# Patient Record
Sex: MALE | Race: BLACK OR AFRICAN AMERICAN | Employment: UNEMPLOYED | URBAN - METROPOLITAN AREA
[De-identification: names, ages, dates, MRNs, and addresses within clinical notes are randomized per-mention and may not be internally consistent; named-entity substitution may affect disease eponyms.]

---

## 2022-02-04 ENCOUNTER — APPOINTMENT (OUTPATIENT)
Dept: GENERAL RADIOLOGY | Age: 42
End: 2022-02-04
Payer: COMMERCIAL

## 2022-02-04 ENCOUNTER — HOSPITAL ENCOUNTER (EMERGENCY)
Age: 42
Discharge: HOME | End: 2022-02-04
Attending: EMERGENCY MEDICINE
Payer: COMMERCIAL

## 2022-02-04 VITALS
SYSTOLIC BLOOD PRESSURE: 129 MMHG | WEIGHT: 180 LBS | HEART RATE: 94 BPM | TEMPERATURE: 98.7 F | OXYGEN SATURATION: 100 % | DIASTOLIC BLOOD PRESSURE: 90 MMHG | HEIGHT: 66 IN | RESPIRATION RATE: 20 BRPM | BODY MASS INDEX: 28.93 KG/M2

## 2022-02-04 DIAGNOSIS — S62.333A CLOSED DISPLACED FRACTURE OF NECK OF THIRD METACARPAL BONE OF LEFT HAND, INITIAL ENCOUNTER: Primary | ICD-10-CM

## 2022-02-04 PROCEDURE — 99283 EMERGENCY DEPT VISIT LOW MDM: CPT

## 2022-02-04 PROCEDURE — 73130 X-RAY EXAM OF HAND: CPT

## 2022-02-04 PROCEDURE — 29125 APPL SHORT ARM SPLINT STATIC: CPT

## 2022-02-04 RX ORDER — HYDROCODONE BITARTRATE AND ACETAMINOPHEN 5; 325 MG/1; MG/1
1 TABLET ORAL EVERY 4 HOURS PRN
Qty: 20 TABLET | Refills: 0 | Status: SHIPPED | OUTPATIENT
Start: 2022-02-04 | End: 2022-02-07

## 2022-02-04 RX ORDER — MELOXICAM 7.5 MG/1
7.5 TABLET ORAL DAILY
Qty: 90 TABLET | Refills: 1 | Status: SHIPPED | OUTPATIENT
Start: 2022-02-04

## 2022-02-04 ASSESSMENT — PAIN SCALES - GENERAL
PAINLEVEL_OUTOF10: 1
PAINLEVEL_OUTOF10: 0

## 2022-02-04 NOTE — Clinical Note
John Ventura was seen and treated in our emergency department on 2/4/2022. He may return to work on 02/09/2022. Please call Dr. Carmenza Kenney office, be seen on Tuesday in clinic for hand fracture     If you have any questions or concerns, please don't hesitate to call.       Krystal Nielsen MD

## 2022-02-04 NOTE — ED PROVIDER NOTES
eMERGENCY dEPARTMENT eNCOUnter        [facility]    Chief Complaint   Patient presents with    Hand Pain     LEFT hand, pt fell yesterday and broke the fall with his hand        HPI    Alen Pedroza is a 36 y.o. male who presents to the ER for evaluation of blunt hand trauma left, positive edema ecchymosis pain with range of motion, right-hand-dominant. On occupational injury. Aching crampy pain with range of motion. REVIEW OF SYSTEMS    See HPI for further details. Review of systems otherwise negative. PAST MEDICAL HISTORY    History reviewed. No pertinent past medical history. SURGICAL HISTORY    History reviewed. No pertinent surgical history. CURRENT MEDICATIONS        ALLERGIES    Allergies   Allergen Reactions    Food Other (See Comments)     Sore throat with bananas       FAMILY HISTORY    Family History   Problem Relation Age of Onset    Diabetes Father     Diabetes Maternal Grandmother     Heart Disease Maternal Grandmother     High Blood Pressure Maternal Grandmother        SOCIAL HISTORY    Social History     Socioeconomic History    Marital status: Single     Spouse name: None    Number of children: None    Years of education: None    Highest education level: None   Occupational History    None   Tobacco Use    Smoking status: Current Every Day Smoker     Packs/day: 1.00     Years: 9.00     Pack years: 9.00    Smokeless tobacco: Never Used   Substance and Sexual Activity    Alcohol use:  Yes     Alcohol/week: 0.8 standard drinks     Types: 1 Standard drinks or equivalent per week    Drug use: Yes     Types: Marijuana Isi Adam)    Sexual activity: None   Other Topics Concern    None   Social History Narrative    None     Social Determinants of Health     Financial Resource Strain:     Difficulty of Paying Living Expenses: Not on file   Food Insecurity:     Worried About Running Out of Food in the Last Year: Not on file    Nick of Food in the Last Year: Not on file Transportation Needs:     Lack of Transportation (Medical): Not on file    Lack of Transportation (Non-Medical): Not on file   Physical Activity:     Days of Exercise per Week: Not on file    Minutes of Exercise per Session: Not on file   Stress:     Feeling of Stress : Not on file   Social Connections:     Frequency of Communication with Friends and Family: Not on file    Frequency of Social Gatherings with Friends and Family: Not on file    Attends Yazidi Services: Not on file    Active Member of [address] [place] [address] or Organizations: Not on file    Attends Club or Organization Meetings: Not on file    Marital Status: Not on file   Intimate Partner Violence:     Fear of Current or Ex-Partner: Not on file    Emotionally Abused: Not on file    Physically Abused: Not on file    Sexually Abused: Not on file   Housing Stability:     Unable to Pay for Housing in the Last Year: Not on file    Number of [place] in the Last Year: Not on file    Unstable Housing in the Last Year: Not on file       PHYSICAL EXAM    VITAL SIGNS: BP (!) 129/90   Pulse 94   Temp 98.7 °F (37.1 °C) (Oral)   Resp 20   Ht 5' 6\" (1.676 m)   Wt 180 lb (81.6 kg)   SpO2 100%   BMI 29.05 kg/m²   Constitutional:  Well developed, well nourished, no acute distress, non-toxic appearance   HENT:  Atraumatic, external ears normal, nose normal, oropharynx moist.  Neck- normal range of motion, no tenderness, supple   Respiratory:  No respiratory distress, normal breath sounds.    Cardiovascular:  Normal rate, normal rhythm, no murmurs, no gallops, no rubs   GI:  Soft, nondistended, normal bowel sounds, nontender   Musculoskeletal: Edema and tenderness of the left mid dorsal hand, pain with flexion extension, no pulse deficit  Integument:  Well hydrated, no rash   Neurologic: Sensory or motor deficit none    RADIOLOGY/PROCEDURES    mild displaced spiral fracture of the third metacarpal    ED COURSE & MEDICAL DECISION MAKING    Pertinent Labs & Imaging studies reviewed. (See chart for details)  Surgical follow-up with  Repeat volar splint, analgesia work release until seen by Ortho possible need for surgical intervention, pin. FINAL IMPRESSION    1. Closed  displaced third metacarpal fracture left  2.          Aly Abel MD  06/61/97 Skye Berry MD  17/40/39 [id number]

## 2022-02-04 NOTE — Clinical Note
Susi Forde was seen and treated in our emergency department on 2/4/2022. He may return to work on 02/09/2022. Please call Dr. Dionte Roman office, be seen on Tuesday in clinic for hand fracture     If you have any questions or concerns, please don't hesitate to call.       Lonnie Martin MD

## 2022-02-09 ENCOUNTER — OFFICE VISIT (OUTPATIENT)
Dept: ORTHOPEDIC SURGERY | Age: 42
End: 2022-02-09
Payer: COMMERCIAL

## 2022-02-09 VITALS — WEIGHT: 175 LBS | BODY MASS INDEX: 28.12 KG/M2 | HEIGHT: 66 IN

## 2022-02-09 DIAGNOSIS — F17.200 CURRENT SMOKER: ICD-10-CM

## 2022-02-09 DIAGNOSIS — S62.323A CLOSED DISPLACED FRACTURE OF SHAFT OF THIRD METACARPAL BONE OF LEFT HAND, INITIAL ENCOUNTER: Primary | ICD-10-CM

## 2022-02-09 PROCEDURE — G8419 CALC BMI OUT NRM PARAM NOF/U: HCPCS | Performed by: ORTHOPAEDIC SURGERY

## 2022-02-09 PROCEDURE — 99203 OFFICE O/P NEW LOW 30 MIN: CPT | Performed by: ORTHOPAEDIC SURGERY

## 2022-02-09 PROCEDURE — G8427 DOCREV CUR MEDS BY ELIG CLIN: HCPCS | Performed by: ORTHOPAEDIC SURGERY

## 2022-02-09 PROCEDURE — 4004F PT TOBACCO SCREEN RCVD TLK: CPT | Performed by: ORTHOPAEDIC SURGERY

## 2022-02-09 PROCEDURE — 99406 BEHAV CHNG SMOKING 3-10 MIN: CPT | Performed by: ORTHOPAEDIC SURGERY

## 2022-02-09 PROCEDURE — 26600 TREAT METACARPAL FRACTURE: CPT | Performed by: ORTHOPAEDIC SURGERY

## 2022-02-09 PROCEDURE — L3807 WHFO W/O JOINTS PRE CST: HCPCS | Performed by: ORTHOPAEDIC SURGERY

## 2022-02-09 PROCEDURE — G8484 FLU IMMUNIZE NO ADMIN: HCPCS | Performed by: ORTHOPAEDIC SURGERY

## 2022-02-13 PROBLEM — S62.323A CLOSED DISPLACED FRACTURE OF SHAFT OF THIRD METACARPAL BONE OF LEFT HAND: Status: ACTIVE | Noted: 2022-02-13

## 2022-02-13 PROBLEM — F17.200 CURRENT SMOKER: Status: ACTIVE | Noted: 2022-02-13

## 2022-02-13 NOTE — PROGRESS NOTES
CHIEF COMPLAINT: Left hand pain/ 3rd MC shaft minimally displaced fracture. DATE OF INJURY: 2/3/2022, DOT 2/9/2022    HISTORY:  Mr. Triny Torres 36 y.o.  male right handed presents today for the first visit for evaluation of a left hand injury which occurred when he fell. He is complaining of ulnar hand pain and swelling. This is better with elevation and worse with ROM. The pain is sharp and not radiating. No other complaint. He was seen at [facility], where he was evaluated and splinted and asked to see orthopedics. He smokes 1PPD. History reviewed. No pertinent past medical history. History reviewed. No pertinent surgical history. Social History     Socioeconomic History    Marital status: Single     Spouse name: Not on file    Number of children: Not on file    Years of education: Not on file    Highest education level: Not on file   Occupational History    Not on file   Tobacco Use    Smoking status: Current Every Day Smoker     Packs/day: 1.00     Years: 9.00     Pack years: 9.00    Smokeless tobacco: Never Used   Substance and Sexual Activity    Alcohol use: Yes     Alcohol/week: 0.8 standard drinks     Types: 1 Standard drinks or equivalent per week    Drug use: Yes     Types: Marijuana Rondi Baba)    Sexual activity: Not on file   Other Topics Concern    Not on file   Social History Narrative    Not on file     Social Determinants of Health     Financial Resource Strain:     Difficulty of Paying Living Expenses: Not on file   Food Insecurity:     Worried About Running Out of Food in the Last Year: Not on file    Nick of Food in the Last Year: Not on file   Transportation Needs:     Lack of Transportation (Medical): Not on file    Lack of Transportation (Non-Medical):  Not on file   Physical Activity:     Days of Exercise per Week: Not on file    Minutes of Exercise per Session: Not on file   Stress:     Feeling of Stress : Not on file   Social Connections:     Frequency of Communication with Friends and Family: Not on file    Frequency of Social Gatherings with Friends and Family: Not on file    Attends Voodoo Services: Not on file    Active Member of Clubs or Organizations: Not on file    Attends Club or Organization Meetings: Not on file    Marital Status: Not on file   Intimate Partner Violence:     Fear of Current or Ex-Partner: Not on file    Emotionally Abused: Not on file    Physically Abused: Not on file    Sexually Abused: Not on file   Housing Stability:     Unable to Pay for Housing in the Last Year: Not on file    Number of [place] in the Last Year: Not on file    Unstable Housing in the Last Year: Not on file       Family History   Problem Relation Age of Onset    Diabetes Father     Diabetes Maternal Grandmother     Heart Disease Maternal Grandmother     High Blood Pressure Maternal Grandmother        Current Outpatient Medications on File Prior to Visit   Medication Sig Dispense Refill    meloxicam (MOBIC) 7.5 MG tablet Take 1 tablet by mouth daily 90 tablet 1     No current facility-administered medications on file prior to visit. Pertinent items are noted in HPI  Review of systems reviewed from Patient History Form and available in the patient's chart under the Media tab. No change noted. PHYSICAL EXAMINATION:  Mr. Carmella Omalley is a very pleasant 36 y.o.  male who presents today in no acute distress, awake, alert, and oriented. He is well dressed, nourished and  groomed. Patient with normal affect. Height is  5' 6\" (1.676 m), weight is 175 lb (79.4 kg), Body mass index is 28.25 kg/m². Resting respiratory rate is 16. Examination of the gait, showed that the patient walks with no limp . Examination of both upper extremities showing a decreased range of motion of the left hand compare to the other side. There is moderate swelling that can be seen, as well as ecchymosis.     He has intact sensation and good radial pulses. He has significant tenderness on deep palpation over the 3rd MC shaft of the left hand. There is no rotational deformity of the left long finger . IMAGING:  Kelly Olvera were reviewed, dated [facility]/4/2022 from [facility],  3 views of the left  hand, and showed left hand 3rd MC shaft minimally displaced fracture. IMPRESSION: Left hand pain/ 3rd MC shaft minimally displaced fracture. PLAN:  I discussed with Landy Siu the treatment options and that the overall alignment of this fracture is good and we can try to treat this non-operatively in an ulnar gutter brace. We discussed the risk of nonunion and or malunion. We applied a brace today in the office and instructed him in care. We will see him back in 6 weeks at which time we will get a new xray of the left hand. The patient smokes cigarettes, and we discussed with the patient the risks of smoking on general health and also on bone and soft tissue healing (delay and non-union), and promised to cut down or stop smoking. Smoking: Educated the patient regarding the hazards of smoking and that it harms their body in many ways. It increases the chance of developing heart disease, lung disease, cancer, and other health problems including poor bone and wound healing. The importance of smoking cessation for optimal bone and wound healing was stressed. This was communicated verbally, 5 Minutes. Procedures    MI CLOSED RX METACARPAL FX    Katelynn Hurley TKO Brace     Patient was prescribed a Katelynn Hurley TKO. The left hand will require stabilization / immobilization from this semi-rigid / rigid orthosis to improve their function. The orthosis will assist in protecting the affected area, provide functional support and facilitate healing. The patient was educated and fit by a healthcare professional with expert knowledge and specialization in brace application while under the direct supervision of the physician.   Verbal and written instructions for the use of and application of this item were provided. They were instructed to contact the office immediately should the brace result in increased pain, decreased sensation, increased swelling or worsening of the condition.        Isis Obrien MD

## 2022-03-25 ENCOUNTER — OFFICE VISIT (OUTPATIENT)
Dept: ORTHOPEDIC SURGERY | Age: 42
End: 2022-03-25

## 2022-03-25 DIAGNOSIS — S62.323A CLOSED DISPLACED FRACTURE OF SHAFT OF THIRD METACARPAL BONE OF LEFT HAND, INITIAL ENCOUNTER: Primary | ICD-10-CM

## 2022-03-25 PROCEDURE — APPNB15 APP NON BILLABLE TIME 0-15 MINS: Performed by: NURSE PRACTITIONER

## 2022-03-25 PROCEDURE — 99024 POSTOP FOLLOW-UP VISIT: CPT | Performed by: NURSE PRACTITIONER

## 2022-03-25 NOTE — LETTER
[facility] Orthopaedics and Spine  [address] [address]  Phone: [phone]  Fax: [phone]    Timbo Araya MD        March 25, 2022     Patient: Jannie Ambrosio   YOB: 1980   Date of Visit: 3/25/2022       To Whom it May Concern:    Jannie Ambrosio was seen in my clinic on 3/25/2022. If you have any questions or concerns, please don't hesitate to call.     Sincerely,           Timbo Araya MD

## 2022-03-25 NOTE — PROGRESS NOTES
CHIEF COMPLAINT: Left hand pain/ 3rd MC shaft minimally displaced fracture. DATE OF INJURY: 2/3/2022, DOT 2/9/2022    HISTORY:  Mr. Madhu Schilling 36 y.o.  male right handed presents today for follow up visit for evaluation of a left hand injury which occurred when he fell. He is complaining of ulnar hand pain and swelling that is improving and feels a bony prominence on the dorsal hand. . This is better with elevation and worse with pushing on it. The pain is mild tender and not radiating. No other complaint. He was seen at [facility], where he was evaluated and splinted and asked to see orthopedics. He has been in a brace. He smokes 1PPD. History reviewed. No pertinent past medical history. History reviewed. No pertinent surgical history. Social History     Socioeconomic History    Marital status: Single     Spouse name: Not on file    Number of children: Not on file    Years of education: Not on file    Highest education level: Not on file   Occupational History    Not on file   Tobacco Use    Smoking status: Current Every Day Smoker     Packs/day: 1.00     Years: 9.00     Pack years: 9.00    Smokeless tobacco: Never Used   Substance and Sexual Activity    Alcohol use: Yes     Alcohol/week: 0.8 standard drinks     Types: 1 Standard drinks or equivalent per week    Drug use: Not Currently     Types: Marijuana Dolan Meckel)     Comment: been over 4 years (2022)    Sexual activity: Not on file   Other Topics Concern    Not on file   Social History Narrative    Not on file     Social Determinants of Health     Financial Resource Strain:     Difficulty of Paying Living Expenses: Not on file   Food Insecurity:     Worried About Running Out of Food in the Last Year: Not on file    Nick of Food in the Last Year: Not on file   Transportation Needs:     Lack of Transportation (Medical): Not on file    Lack of Transportation (Non-Medical):  Not on file   Physical Activity:     Days of Exercise per Week: Not on file    Minutes of Exercise per Session: Not on file   Stress:     Feeling of Stress : Not on file   Social Connections:     Frequency of Communication with Friends and Family: Not on file    Frequency of Social Gatherings with Friends and Family: Not on file    Attends Restorationism Services: Not on file    Active Member of [address] [place] [address] [employer] or Organizations: Not on file    Attends Club or Organization Meetings: Not on file    Marital Status: Not on file   Intimate Partner Violence:     Fear of Current or Ex-Partner: Not on file    Emotionally Abused: Not on file    Physically Abused: Not on file    Sexually Abused: Not on file   Housing Stability:     Unable to Pay for Housing in the Last Year: Not on file    Number of [place] in the Last Year: Not on file    Unstable Housing in the Last Year: Not on file       Family History   Problem Relation Age of Onset    Diabetes Father     Diabetes Maternal Grandmother     Heart Disease Maternal Grandmother     High Blood Pressure Maternal Grandmother        Current Outpatient Medications on File Prior to Visit   Medication Sig Dispense Refill    meloxicam (MOBIC) 7.5 MG tablet Take 1 tablet by mouth daily 90 tablet 1     No current facility-administered medications on file prior to visit. Pertinent items are noted in HPI  Review of systems reviewed from Patient History Form and available in the patient's chart under the Media tab. No change noted. PHYSICAL EXAMINATION:  Mr. Giovana Cee is a very pleasant 36 y.o.  male who presents today in no acute distress, awake, alert, and oriented. He is well dressed, nourished and  groomed. Patient with normal affect. Height is   , weight is  , There is no height or weight on file to calculate BMI. Resting respiratory rate is 16. Examination of the gait, showed that the patient walks with no limp .   Examination of both upper extremities showing a decreased range of motion of the left hand compare to the other side. There is mild swelling that can be seen, no ecchymosis. He has intact sensation and good radial pulses. He has mild tenderness on deep palpation over the 3rd MC shaft of the left hand with dorsal bony prominence on the dorsal hand. There is no rotational deformity of the left long finger . IMAGING:  Andrew Lester were reviewed, dated today in office,  3 views of the left  hand, and showed left hand 3rd MC shaft minimally displaced fracture with callus. IMPRESSION: Left hand pain/ 3rd MC shaft minimally displaced fracture. PLAN:  I discussed with Susi Forde the treatment options and that the overall alignment of this fracture is good. He can discontinue the ulnar gutter brace. We discussed the risk of nonunion and or malunion. We will see him back in 2 months at which time we will get a new xray of the left hand. The patient smokes cigarettes, and we discussed with the patient the risks of smoking on general health and also on bone and soft tissue healing (delay and non-union), and promised to cut down or stop smoking. Smoking: Educated the patient regarding the hazards of smoking and that it harms their body in many ways. It increases the chance of developing heart disease, lung disease, cancer, and other health problems including poor bone and wound healing. The importance of smoking cessation for optimal bone and wound healing was stressed. This was communicated verbally, 5 Minutes.         MOE Esquivel - CNP

## 2022-03-25 NOTE — Clinical Note
[facility] Orthopaed[facility]  [facility] [address] [address]  Phone: [phone]  Fax: [phone]    Reema Palacio MD        March 25, 2022     Patient: Breanne Montenegro   YOB: 1980   Date of Visit: 3/25/2022       To Whom It May Concern: It is my medical opinion that Breanne Montenegro {Work release (duty restriction):59480}. If you have any questions or concerns, please don't hesitate to call.     Sincerely,        Reema Palacio MD